# Patient Record
Sex: FEMALE | Race: WHITE
[De-identification: names, ages, dates, MRNs, and addresses within clinical notes are randomized per-mention and may not be internally consistent; named-entity substitution may affect disease eponyms.]

---

## 2022-03-10 ENCOUNTER — NURSE TRIAGE (OUTPATIENT)
Dept: OTHER | Facility: CLINIC | Age: 52
End: 2022-03-10

## 2022-03-10 NOTE — TELEPHONE ENCOUNTER
Subjective: Caller states \"I've been having sharp abdominal pain\"     Current Symptoms: lower right abdominal pain for the past 3 days. A little nausea, no vomiting, a little diarrhea. No radiation of pain. Pain worse with deep breathing, bending over or blowing her nose. No urinary symptoms. Standing straight and tall hurts. Onset: 3 days ago; unchanged    Associated Symptoms: NA    Pain Severity: 7/10; sharp; constant    Temperature: no fever hasn't checked but doesn't feel feverish    What has been tried: advil-no help    LMP: 2 weeks ago Pregnant: No    Recommended disposition: Go to ED/[facility]CC Now (Or to Office with PCP Approval)    Care advice provided, patient verbalizes understanding; denies any other questions or concerns; instructed to call back for any new or worsening symptoms. Patient/caller agrees to proceed to nearest [facility]     This triage is a result of a call to [address] [place] [address]. Please do not respond to the triage nurse through this encounter. Any subsequent communication should be directly with the patient.         Reason for Disposition   Constant abdominal pain lasting > 2 hours    Protocols used: ABDOMINAL PAIN - FEMALE-ADULT-[place]